# Patient Record
Sex: MALE | Race: WHITE | Employment: FULL TIME | URBAN - METROPOLITAN AREA
[De-identification: names, ages, dates, MRNs, and addresses within clinical notes are randomized per-mention and may not be internally consistent; named-entity substitution may affect disease eponyms.]

---

## 2017-04-17 ENCOUNTER — HOSPITAL ENCOUNTER (OUTPATIENT)
Dept: GENERAL RADIOLOGY | Age: 37
Discharge: HOME | End: 2017-04-17
Payer: COMMERCIAL

## 2017-04-17 ENCOUNTER — HOSPITAL ENCOUNTER (OUTPATIENT)
Age: 37
Discharge: HOME | End: 2017-04-17
Payer: COMMERCIAL

## 2017-04-17 DIAGNOSIS — T14.90XA INJURY: ICD-10-CM

## 2017-04-17 PROCEDURE — 73130 X-RAY EXAM OF HAND: CPT

## 2019-02-05 PROBLEM — I48.0 PAF (PAROXYSMAL ATRIAL FIBRILLATION) (HCC): Status: ACTIVE | Noted: 2019-02-05

## 2019-02-05 PROBLEM — I37.1 NONRHEUMATIC PULMONARY VALVE INSUFFICIENCY: Status: ACTIVE | Noted: 2019-02-05

## 2019-07-11 ENCOUNTER — HOSPITAL ENCOUNTER (OUTPATIENT)
Dept: OCCUPATIONAL THERAPY | Facility: CLINIC | Age: 39
Setting detail: THERAPIES SERIES
Discharge: HOME | End: 2019-07-11
Payer: COMMERCIAL

## 2019-07-11 PROCEDURE — 97165 OT EVAL LOW COMPLEX 30 MIN: CPT

## 2019-07-11 PROCEDURE — 97140 MANUAL THERAPY 1/> REGIONS: CPT

## 2019-07-11 PROCEDURE — 97110 THERAPEUTIC EXERCISES: CPT

## 2019-07-11 NOTE — CONSULTS
Exam (limitations, restrictions) [x]  1-2 []  3 []  4+   Decision Making [x]  Low []  Moderate []  High   ? [x]  Low Complexity []  Moderate Complexity []  High Complexity     Total Treatment Time:  72  Min.      Time In/Time Out: 1073 - 7788       Electronically signed by SIMON Iniguez/L, CHT on 7/11/2019 at 12:57 PM

## 2019-07-17 ENCOUNTER — HOSPITAL ENCOUNTER (OUTPATIENT)
Dept: OCCUPATIONAL THERAPY | Facility: CLINIC | Age: 39
Setting detail: THERAPIES SERIES
Discharge: HOME | End: 2019-07-17
Payer: COMMERCIAL

## 2019-07-17 PROCEDURE — 97110 THERAPEUTIC EXERCISES: CPT

## 2019-07-17 PROCEDURE — 97140 MANUAL THERAPY 1/> REGIONS: CPT

## 2019-07-17 PROCEDURE — 97035 APP MDLTY 1+ULTRASOUND EA 15: CPT

## 2019-07-18 ENCOUNTER — HOSPITAL ENCOUNTER (OUTPATIENT)
Dept: OCCUPATIONAL THERAPY | Facility: CLINIC | Age: 39
Setting detail: THERAPIES SERIES
Discharge: HOME | End: 2019-07-18
Payer: COMMERCIAL

## 2019-07-18 PROCEDURE — 97110 THERAPEUTIC EXERCISES: CPT

## 2019-07-18 PROCEDURE — 97035 APP MDLTY 1+ULTRASOUND EA 15: CPT

## 2019-07-18 PROCEDURE — 97140 MANUAL THERAPY 1/> REGIONS: CPT

## 2019-07-18 NOTE — FLOWSHEET NOTE
[] [place]       Occupational Therapy             1st floor       [address] [place], [place]         Phone: [phone]       Fax: [phone] [x] [address] [facility] at            [address] [facility] , [address] [place] [address]     Phone: [phone]     Fax: [phone]      Occupational Therapy Daily Treatment Note    Date:  2019  Patient Name:  Kaylan Terry    :  1980  MRN: [id number]  Physician: [facility]  Insurance: [id number] [employer]     Medical Diagnosis: Bilateral wrist sprain S63.8x1A, W70.4W0D. Rehab Codes: Stiffness in wrist M25.63, fine motor skills loss R29.818, edema localized R60.0, pain in right forearm M79.631, pain in left forearm M79.632, pain in right hand M79.641, pain in left hand M79.642, muscle wasting of forearm M62.53, muscle wasting of hand M62.54,   paresthesia of skin R20.2.      Onset Date: 19               Next Dr. Coffman [place]: 19   [facility]  #Visits/Total Visits: 3     3 times a week for 9 visits, C-9 approved  Cancels/No Shows:  0/0    Subjective:    Pain:  [] Yes  [x] No Location:  N/A Pain Rating: (0-10 scale) 0/10  Pain altered Tx:  [x] No  [] Yes  Action: A  Pt Comments: \"Middle and ring fingers still numb, no pain really\".     Objective:  Modalities:    Modality Flow Sheet:  START STOP Tx Modality     Electrical Stim:     19  Ultrasound:  0.8 W/cm2 x 8 mins total  Duty factor: __100%  __50%  __33% __20%  Head size: 2 cm  MHz: __1mHz  __3mHz  Location: Bilateral volar wrists, 4 minutes each     Hot Pack:     Cold Pack:       Exercises:  Flow Sheet:  Exercise Reps/Time Weight/Level Comments   Edema massage     Administered     AROM - bilateral forearms, wrists, hands 12 reps   Increased reps  HEP reviewed, pt indep,   Goal MET   Pre-nabil wrist braces: assessment for fit and B wrist position       Pt reports improved comfort and fit.    Resistive  with hand exercisor R - 15 reps  L - 12 reps  10 pounds   Bilat Decreased reps and resistance    Resistive /pinches with putty 5 reps  White  Decreased resistance, decreased reps                  Comments/Assessment:  Pt reports bilat wrists, hands are \"sore\". 5/10 pain reported on right, 4/10 pain reported on left on arrival. Pt reports significant difficulty shaving this morning, dropped razor twice. Pt states he slept well; woke up with numbness of right middle and ring fingers of right and all fingers of left. Sensation has returned to all fingers of left as of arrival, right unchanged. Bilateral wrist and hand edema increased AEB observation and decreased skin wrinkling, more so on right, radial aspect of hand. An extended time was spent on retrograde edema massage today, with significant decrease in pain bilaterally. Ex's decreased; reps and resistance. Edema massage HEP reviewed with pt, including elevation technique. Pt voiced understanding. 0/10 pain reported at end of session. Specific Instructions for Next Treatment:    Treatment Charges: Mins Units   [x]  Modalities:  Ultrasound 8 1   [x]  Ther Exercise 13 1   [x]  Manual Therapy 35 2   []  Ther Activities     []  Orthotic fitting/training     []  Orthotic re-check     []  Other         Assessment: [x] Progressing toward goals. [] No change. [] Other:    Short Term Goals: (  9    Treatments)  1. Decrease Pain: Will have 0/10 pain at rest, will have 2/10 pain with non-resistive to mildly resistive activity. 2. Increase AROM (degrees): Bilateral wrist extension will be +65 or more degrees (WNL), flexion will be 45 or more degrees, ulnar deviation will be 40 or more degrees (WNL), right forearm pronation will be 80 or more degrees, Bilateral supination will be 75 or more degrees. 3. Increase strength (pounds): Right  strength will be 63 or more pounds, left  will be 76 or more pounds, bilateral pinches will be symmetrical (WNL).   4. Increase function: UE Functional Index Score 34/80

## 2019-07-22 ENCOUNTER — HOSPITAL ENCOUNTER (OUTPATIENT)
Dept: OCCUPATIONAL THERAPY | Facility: CLINIC | Age: 39
Setting detail: THERAPIES SERIES
Discharge: HOME | End: 2019-07-22
Payer: COMMERCIAL

## 2019-07-22 PROCEDURE — 97110 THERAPEUTIC EXERCISES: CPT

## 2019-07-22 PROCEDURE — 97140 MANUAL THERAPY 1/> REGIONS: CPT

## 2019-07-24 ENCOUNTER — APPOINTMENT (OUTPATIENT)
Dept: OCCUPATIONAL THERAPY | Facility: CLINIC | Age: 39
End: 2019-07-24
Payer: COMMERCIAL

## 2019-07-25 ENCOUNTER — HOSPITAL ENCOUNTER (OUTPATIENT)
Dept: OCCUPATIONAL THERAPY | Facility: CLINIC | Age: 39
Setting detail: THERAPIES SERIES
Discharge: HOME | End: 2019-07-25
Payer: COMMERCIAL

## 2019-07-25 PROCEDURE — 97035 APP MDLTY 1+ULTRASOUND EA 15: CPT

## 2019-07-25 PROCEDURE — 97110 THERAPEUTIC EXERCISES: CPT

## 2019-07-25 PROCEDURE — 97140 MANUAL THERAPY 1/> REGIONS: CPT

## 2019-07-29 ENCOUNTER — HOSPITAL ENCOUNTER (OUTPATIENT)
Dept: OCCUPATIONAL THERAPY | Facility: CLINIC | Age: 39
Setting detail: THERAPIES SERIES
Discharge: HOME | End: 2019-07-29
Payer: COMMERCIAL

## 2019-07-29 PROCEDURE — 97110 THERAPEUTIC EXERCISES: CPT

## 2019-07-29 PROCEDURE — 97140 MANUAL THERAPY 1/> REGIONS: CPT

## 2019-07-29 NOTE — FLOWSHEET NOTE
[] [place]       Occupational Therapy             1st floor       [address] [place], [place]         Phone: [phone]       Fax: [phone] [x] [address] [facility] at            [address] [facility] , [address] [place] [address]     Phone: [phone]     Fax: [phone]      Occupational Therapy Daily Treatment Note    Date:  2019  Patient Name:  Shaina Marcus    :  1980  MRN: [id number]  Physician: Gavino Conn CNP  Insurance: [facility] [employer]     Medical Diagnosis: Bilateral wrist sprain S63.8x1A, B86.2Q2O. Rehab Codes: Stiffness in wrist M25.63, fine motor skills loss R29.818, edema localized R60.0, pain in right forearm M79.631, pain in left forearm M79.632, pain in right hand M79.641, pain in left hand M79.642, muscle wasting of forearm M62.53, muscle wasting of hand M62.54,   paresthesia of skin R20.2.      Onset Date: 19               Next Dr. Kim Barney: 19   [facility]  #Visits/Total Visits:      3 times a week for 9 visits, C-9 approved  Cancels/No Shows:  0/0    Subjective:    Pain:  [] Yes  [x] No Location:  N/A Pain Rating: (0-10 scale) 0/10  Pain altered Tx:  [x] No  [] Yes  Action: A  Pt Comments: \"Both hands feeling much better since starting steroids\".     Objective:  Modalities:    Modality Flow Sheet:  START STOP Tx Modality     Electrical Stim:     19  Ultrasound:  0.8 W/cm2 x 8 mins total  Duty factor: __100%  __50%  __33% __20%  Head size: 2 cm  MHz: __1mHz  __3mHz  Location: Bilateral volar wrists, 4 minutes each     Hot Pack:     Cold Pack:       Exercises:  Flow Sheet:  Exercise Reps/Time Weight/Level Comments   Edema massage     Administered     AROM - bilateral forearms, wrists, hands 17 reps   Completed     Pre-nabil wrist braces: assessment for fit and B wrist position       Pt reports a good fit    Resistive  with hand exercisor R - 53 reps  L -  57 reps  20 pounds   Bilat Increased resistance and reps

## 2019-07-31 ENCOUNTER — HOSPITAL ENCOUNTER (OUTPATIENT)
Dept: OCCUPATIONAL THERAPY | Facility: CLINIC | Age: 39
Setting detail: THERAPIES SERIES
Discharge: HOME | End: 2019-07-31
Payer: COMMERCIAL

## 2019-08-01 ENCOUNTER — HOSPITAL ENCOUNTER (OUTPATIENT)
Dept: OCCUPATIONAL THERAPY | Facility: CLINIC | Age: 39
Setting detail: THERAPIES SERIES
Discharge: HOME | End: 2019-08-01
Payer: COMMERCIAL

## 2019-08-01 PROCEDURE — 97110 THERAPEUTIC EXERCISES: CPT

## 2019-08-01 PROCEDURE — 97140 MANUAL THERAPY 1/> REGIONS: CPT

## 2019-08-01 NOTE — FLOWSHEET NOTE
- MET (56/80)  5. Decrease Edema: Circumfirential wrist measurements will be decreased 0.3 cm or more, will observe increased dorsal hand skin wrinkling - ongoing. 6. Independent with HEP in 2 sessions - MET.     Long Term Goals: (  18  Treatments)  1. Decrease Pain: Will have 0/10  pain with mildly resistive activity. Ongoing  2. Increase AROM (degrees): Bilateral wrist flexion will be 55 or more degrees, ulnar deviation will be 40 or more degrees, bilateral supination will be 75 or more degrees. All ongoing  3. Increase strength (pounds): Right  strength will be 73 or more pounds, left  will be 86 or more pounds. All ongoing  4. Increase function: UE Functional Index Score 44/80 or more to promote increased function. Ongoing  5. Decrease Edema: Circumfirential wrist measurements will be symmetrical, will observe  dorsal hand skin wrinkling WNL. Ongoing     Patient Goals: 100% recovery - Unmet       Pt. Education:  [] Yes  [x] No  [] Reviewed Prior HEP/Ed  Method of Education: [] Verbal  [] Demo  [] Written  Re:  Comprehension of Education:  [] Verbalizes understanding. [] Demonstrates understanding. [] Needs review. [] Demonstrates/verbalizes HEP/Ed previously given. Treatment Plan: 3 times a week for 9 visits         Time In/Out: 1300 -  1345 Total Treatment Time: 39  Min.       Electronically signed by:  Elaine Bae OT

## 2019-08-07 ENCOUNTER — HOSPITAL ENCOUNTER (OUTPATIENT)
Dept: OCCUPATIONAL THERAPY | Facility: CLINIC | Age: 39
Setting detail: THERAPIES SERIES
Discharge: HOME | End: 2019-08-07
Payer: COMMERCIAL

## 2019-08-07 ENCOUNTER — APPOINTMENT (OUTPATIENT)
Dept: OCCUPATIONAL THERAPY | Facility: CLINIC | Age: 39
End: 2019-08-07
Payer: COMMERCIAL

## 2019-08-07 PROCEDURE — 97110 THERAPEUTIC EXERCISES: CPT

## 2019-08-07 NOTE — FLOWSHEET NOTE
resistance, decreased reps   Resistive /pinches with putty 5 reps  Peach Increased resistance  Putty issued for HEP.    Wrist maze  4 reps ea   Increased reps    Active pron/supin with cones   Discontinued   Pronation/supination bar 10 reps ea 1/2 pound, 1/2 out bar Added   Active wrist flex/extension with Dexteroll 2 series ea 1 pound  Completed              Comments/Assessment:  Pt reports he has been taking a steroid ordered by Mendel Been, CNP for edema with  continued positive results as follows:  1. Pt reports bilat wrists, hands have  0/10 pain on arrival.    2. Pt states numbness of right middle and ring fingers of right hand has resolved. 3. Edema has decreased enough for pt to wear his wedding ring on the left hand \"it fits like normal\". 4. Ex's have all been advanced with resistance added as tolerated. Retrograde massage for edema administered for right only. Pt reports an EMG has been requested. Ex's added, performed, advanced as outlined above with good tolerance and no change in symptoms. Specific Instructions for Next Treatment: Progress Note and measurements to be taken next visit. Treatment Charges: Mins Units   [x]  Modalities:  Ultrasound 8 0   [x]  Ther Exercise 38 3   [x]  Manual Therapy   4 0   []  Ther Activities     []  Orthotic fitting/training     []  Orthotic re-check     []  Other         Assessment: [x] Progressing toward goals. [] No change. [] Other:    Short Term Goals: (  9    Treatments)  1. Decrease Pain: Will have 0/10 pain at rest - ongoing, will have 2/10 pain with non-resistive to mildly resistive activity - MET.   2. Increase AROM (degrees): Bilateral wrist extension will be +65 or more degrees (WNL) - ongoing, flexion will be 45 or more degrees - MET for left, ulnar deviation will be 40 or more degrees (WNL) - ongoing, right forearm pronation will be 80 or more degrees - ongoing, bilateral supination will be 75 or more degrees - ongoing. 3. Increase strength (pounds): Right  strength will be 63 or more pounds - MET (92.3 pounds), left  will be 76 or more pounds - MET (96.3 pounds), bilateral pinches will be symmetrical (WNL) - MET for 3 point pinch bilaterally. 4. Increase function: UE Functional Index Score 34/80 or more to promote increased function - MET (56/80)  5. Decrease Edema: Circumfirential wrist measurements will be decreased 0.3 cm or more, will observe increased dorsal hand skin wrinkling - ongoing. 6. Independent with HEP in 2 sessions - MET.     Long Term Goals: (  18  Treatments)  1. Decrease Pain: Will have 0/10  pain with mildly resistive activity. Ongoing  2. Increase AROM (degrees): Bilateral wrist flexion will be 55 or more degrees, ulnar deviation will be 40 or more degrees, bilateral supination will be 75 or more degrees. All ongoing  3. Increase strength (pounds): Right  strength will be 73 or more pounds, left  will be 86 or more pounds. All ongoing  4. Increase function: UE Functional Index Score 44/80 or more to promote increased function. Ongoing  5. Decrease Edema: Circumfirential wrist measurements will be symmetrical, will observe  dorsal hand skin wrinkling WNL. Ongoing     Patient Goals: 100% recovery - Unmet       Pt. Education:  [] Yes  [] No  [x] Reviewed Prior HEP/Ed  Method of Education: [x] Verbal  [x] Demo  [] Written  Re:  Comprehension of Education:  [] Verbalizes understanding. [] Demonstrates understanding. [] Needs review. [x] Demonstrates/verbalizes HEP/Ed previously given. Treatment Plan: 3 times a week for 9 visits         Time In/Out: 1301 -  1351 Total Treatment Time: 48  Min.       Electronically signed by:  SIMON Caballero/BRYON, MELODY

## 2019-08-08 ENCOUNTER — HOSPITAL ENCOUNTER (OUTPATIENT)
Dept: OCCUPATIONAL THERAPY | Facility: CLINIC | Age: 39
Setting detail: THERAPIES SERIES
Discharge: HOME | End: 2019-08-08
Payer: COMMERCIAL

## 2019-08-08 PROCEDURE — 97110 THERAPEUTIC EXERCISES: CPT

## 2019-08-08 NOTE — PROGRESS NOTES
seconds Percentile Left in seconds Percentile   9 Hole Peg Test 16.07  1.99 seconds faster Greater than the 75th percentile 15.72  0.75 seconds faster Greater than the 75th percentile        Edema:  Circumfirential wrist measurements:  Right 20.2 cm (incr 0.4 cm), Left 20.1 cm (incr 0.5 cm). Dorsum of both hands are mildly edematous AEB observation of skin wrinkling,  and palpation. Sensation:  Pt reports numbness of tips of right middle and ring fingers has resolved. Problems: Pain, ROM, Strength, Function, Edema, Coordination and Sensation      Short Term Goals: (  9    Treatments)   1. Decrease Pain: Will have 0/10 pain at rest - MET, will have 2/10 pain with non-resistive to mildly resistive activity - MET. 2. Increase AROM (degrees): Bilateral wrist extension will be +65 or more degrees (WNL) - MET for left only, flexion will be 45 or more degrees - MET bilaterally, ulnar deviation will be 40 or more degrees (WNL) - Unmet, right forearm pronation will be 80 or more degrees - Unmet, bilateral supination will be 75 or more degrees - MET for right only. 3. Increase strength (pounds): Right  strength will be 63 or more pounds - MET (86 pounds), left  will be 76 or more pounds - MET (103.6 pounds), bilateral pinches will be symmetrical (WNL) - Unmet bilaterally  4. Increase function: UE Functional Index Score 34/80 or more to promote increased function - MET (54/80)  5. Decrease Edema: Circumfirential wrist measurements will be decreased 0.3 cm or more, will observe increased dorsal hand skin wrinkling - Unmet.   6. Independent with HEP in 2 sessions - MET.     Long Term Goals: (  15  Treatments)   1.   Decrease Pain: Will have 0/10  pain with mildly resistive activity.  MET  2.   Increase AROM (degrees): Bilateral wrist flexion will be 55 or more degrees - MET, ulnar deviation will be 40 or more degrees - Ongoing, bilateral supination will be 75 or more degrees - MET for right only  3.   Increase

## 2019-08-12 ENCOUNTER — HOSPITAL ENCOUNTER (OUTPATIENT)
Dept: OCCUPATIONAL THERAPY | Facility: CLINIC | Age: 39
Setting detail: THERAPIES SERIES
Discharge: HOME | End: 2019-08-12
Payer: COMMERCIAL

## 2019-08-12 PROCEDURE — 97035 APP MDLTY 1+ULTRASOUND EA 15: CPT

## 2019-08-12 PROCEDURE — 97110 THERAPEUTIC EXERCISES: CPT

## 2019-08-14 ENCOUNTER — APPOINTMENT (OUTPATIENT)
Dept: OCCUPATIONAL THERAPY | Facility: CLINIC | Age: 39
End: 2019-08-14
Payer: COMMERCIAL

## 2019-08-15 ENCOUNTER — HOSPITAL ENCOUNTER (OUTPATIENT)
Dept: OCCUPATIONAL THERAPY | Facility: CLINIC | Age: 39
Setting detail: THERAPIES SERIES
Discharge: HOME | End: 2019-08-15
Payer: COMMERCIAL

## 2019-08-15 PROCEDURE — 97035 APP MDLTY 1+ULTRASOUND EA 15: CPT

## 2019-08-15 PROCEDURE — 97110 THERAPEUTIC EXERCISES: CPT

## 2019-08-15 PROCEDURE — 97140 MANUAL THERAPY 1/> REGIONS: CPT

## 2019-08-19 ENCOUNTER — HOSPITAL ENCOUNTER (OUTPATIENT)
Dept: OCCUPATIONAL THERAPY | Facility: CLINIC | Age: 39
Setting detail: THERAPIES SERIES
Discharge: HOME | End: 2019-08-19
Payer: COMMERCIAL

## 2019-08-19 PROCEDURE — 97110 THERAPEUTIC EXERCISES: CPT

## 2019-08-19 PROCEDURE — 97035 APP MDLTY 1+ULTRASOUND EA 15: CPT

## 2019-08-19 NOTE — FLOWSHEET NOTE
Resistive /pinches with putty 10 reps  Peach/Orange combo Completed  Plan to increase resistance next visit    Wrist maze   Discontinued   Active pron/supin with cones     Discontinued   Pronation/supination bar 2 sets of  10 reps ea 1/2 pound, end of bar Increased reps and resistance   Active wrist flex/extension with Dexteroll 2 series ea 2.5 pounds Increased  resistance                  Comments/Assessment:  Pt on a second round of steroids, with benefit of 0/10 pain, and edema resolved in both UEs. EMG scheduled for 09/24/19. Ex's performed, advanced as outlined above with good tolerance and no change in symptoms. 0/10 pain at end of session. Specific Instructions for Next Treatment: Progress Note and measurements to be taken next visit. Treatment Charges: Mins Units   [x]  Modalities:  Ultrasound   8 1   [x]  Ther Exercise 35 2   [x]  Manual Therapy   0 0   []  Ther Activities     []  Orthotic fitting/training     []  Orthotic re-check     []  Other         Assessment: [x] Progressing toward goals. [] No change. [] Other:    Short Term Goals: (  9    Treatments)   1. Decrease Pain: Will have 0/10 pain at rest - MET, will have 2/10 pain with non-resistive to mildly resistive activity - MET. 2. Increase AROM (degrees): Bilateral wrist extension will be +65 or more degrees (WNL) - MET for left only, flexion will be 45 or more degrees - MET bilaterally, ulnar deviation will be 40 or more degrees (WNL) - Unmet, right forearm pronation will be 80 or more degrees - Unmet, bilateral supination will be 75 or more degrees - MET for right only. 3. Increase strength (pounds): Right  strength will be 63 or more pounds - MET (86 pounds), left  will be 76 or more pounds - MET (103.6 pounds), bilateral pinches will be symmetrical (WNL) - Unmet bilaterally  4. Increase function: UE Functional Index Score 34/80 or more to promote increased function - MET (54/80)  5.  Decrease Edema:

## 2019-08-21 ENCOUNTER — HOSPITAL ENCOUNTER (OUTPATIENT)
Dept: OCCUPATIONAL THERAPY | Facility: CLINIC | Age: 39
Setting detail: THERAPIES SERIES
Discharge: HOME | End: 2019-08-21
Payer: COMMERCIAL

## 2019-08-21 PROCEDURE — 97110 THERAPEUTIC EXERCISES: CPT

## 2019-08-21 NOTE — FLOWSHEET NOTE
[] [place]       Occupational Therapy             1st floor       [address] [place], [place]         Phone: [phone]       Fax: [phone] [x] [facility] [facility] at            [address] [facility] , [address] [place] [address]     Phone: [phone]     Fax: [phone]      Occupational Therapy Daily Treatment Note    Date:  2019  Patient Name:  Alberto Romano    :  1980  MRN: [id number]  Physician: Holley Forrester CNP  Insurance: [facility] - [employer]     Medical Diagnosis: Bilateral wrist sprain S63.8x1A, D20.7L6F.        Rehab Codes: Stiffness in wrist M25.63, fine motor skills loss R29.818, edema localized R60.0, pain in right forearm M79.631, pain in left forearm M79.632, pain in right hand M79.641, pain in left hand M79.642, muscle wasting of forearm M62.53, muscle wasting of hand M62.54,   paresthesia of skin R20.2.      Onset Date: 19               Next Dr. Julius Tanner: 19   [facility]  #Visits/Total Visits: 13/15    C-9 dated 19 approved for additional 6 visits (15 total)  Cancels/No Shows:  0/0    Subjective:    Pain:  [] Yes  [x] No Location:  N/A Pain Rating: (0-10 scale) 0/10  Pain altered Tx:  [x] No  [] Yes  Action: NA  Pt Comments: NA    Objective:  Modalities:    Modality Flow Sheet:  START STOP Tx Modality     Electrical Stim:     19  Ultrasound:  0.8 W/cm2 x 8 mins total  Duty factor: __100%  __50%  __33% __20%  Head size: 2 cm  MHz: __1mHz  __3mHz  Location: Bilateral volar wrists, 4 minutes each     Hot Pack:     Cold Pack:       Exercises:  Flow Sheet:  Exercise Reps/Time Weight/Level Comments   Edema massage     No edema observed or reported,   Not Administered      AROM - bilateral forearms, wrists, hands 20 reps   Completed, Right only  Left discontinued   Pre-nabil wrist braces: assessment for fit and B wrist position     Pt reports a good fit, continues to use at night.    Resistive  with hand exercisor R - 64 reps   L -  74 reps  35 Edema: Circumfirential wrist measurements will be decreased 0.3 cm or more, will observe increased dorsal hand skin wrinkling - Unmet.   6. Independent with HEP in 2 sessions - MET.     Long Term Goals: (  15  Treatments)   1.   Decrease Pain: Will have 0/10  pain with mildly resistive activity. MET  2.   Increase AROM (degrees): Bilateral wrist flexion will be 55 or more degrees - MET, ulnar deviation will be 40 or more degrees - Ongoing, bilateral supination will be 75 or more degrees - MET for right only  3.   Increase strength (pounds): Right  strength will be 73 or more pounds - MET (86 pounds), left  will be 86 or more pounds - MET (103.6 pounds). New  strength goal (revised 08/08/19): Right  strength will be 85 or more pounds, left  will be 115 or more pounds. 4.   Increase function: UE Functional Index Score 44/80 or more to promote increased function - MET (54/80). New Function goal (revised 08/08/19): UE Functional Index Score 64/80 or more to promote increased function. 5.   Decrease Edema: Circumfirential wrist measurements will be symmetrical, will observe  dorsal hand skin wrinkling WNL. Improved, Unmet     Patient Goals: 100% recovery - Unmet          Pt. Education:  [] Yes  [] No  [x] Reviewed Prior HEP/Ed  Method of Education: [x] Verbal  [] Demo  [] Written  Re:  Comprehension of Education:  [] Verbalizes understanding. [] Demonstrates understanding. [] Needs review. [x] Demonstrates/verbalizes HEP/Ed previously given. Treatment Plan: 3 times a week for 9 visits         Time In/Out: 5823 - 5724 Total Treatment Time:   39  Min.       Electronically signed by:  SIMON Mar/L, MELODY

## 2019-08-22 ENCOUNTER — HOSPITAL ENCOUNTER (OUTPATIENT)
Dept: OCCUPATIONAL THERAPY | Facility: CLINIC | Age: 39
Setting detail: THERAPIES SERIES
Discharge: HOME | End: 2019-08-22
Payer: COMMERCIAL

## 2019-08-22 PROCEDURE — 97110 THERAPEUTIC EXERCISES: CPT

## 2019-08-22 NOTE — FLOWSHEET NOTE
pounds   Bilat Increased reps   Resistive /pinches with putty 10 reps  Orange Completed    Wrist maze   Discontinued   Active pron/supin with cones     Discontinued   Pronation/supination bar 20 ea 1 pound, end of bar Completed   Active wrist flex/extension with Dexteroll 2 series ea 3 pounds Completed                  Comments/Assessment:  Pt saw Dr. Irais Erazo yesterday, pt to complete current script then discharge. Pt to return to work following 09/02/19. One visit remains on current script/C-9 auth. EMG scheduled for 09/24/19.  0/10 pain today, ultrasound on hold. Ex's performed, advanced as outlined above with good tolerance. Putty continued for /pinch strengthening, Zero fatigue reported following putty ex. 0/10 pain at end of session. Brody Hilton Specific Instructions for Next Treatment: Progress Note and measurements to be taken next visit. Treatment Charges: Mins Units   [x]  Modalities:  Ultrasound   0 0   [x]  Ther Exercise 41 3   [x]  Manual Therapy   0 0   []  Ther Activities     []  Orthotic fitting/training     []  Orthotic re-check     []  Other         Assessment: [x] Progressing toward goals. [] No change. [] Other:    Short Term Goals: (  9    Treatments)   1. Decrease Pain: Will have 0/10 pain at rest - MET, will have 2/10 pain with non-resistive to mildly resistive activity - MET. 2. Increase AROM (degrees): Bilateral wrist extension will be +65 or more degrees (WNL) - MET for left only, flexion will be 45 or more degrees - MET bilaterally, ulnar deviation will be 40 or more degrees (WNL) - Unmet, right forearm pronation will be 80 or more degrees - Unmet, bilateral supination will be 75 or more degrees - MET for right only. 3. Increase strength (pounds): Right  strength will be 63 or more pounds - MET (86 pounds), left  will be 76 or more pounds - MET (103.6 pounds), bilateral pinches will be symmetrical (WNL) - Unmet bilaterally  4.  Increase function: UE Functional Index

## 2019-08-26 ENCOUNTER — HOSPITAL ENCOUNTER (OUTPATIENT)
Dept: OCCUPATIONAL THERAPY | Facility: CLINIC | Age: 39
Setting detail: THERAPIES SERIES
Discharge: HOME | End: 2019-08-26
Payer: COMMERCIAL

## 2019-08-26 PROCEDURE — 97110 THERAPEUTIC EXERCISES: CPT

## 2019-08-26 NOTE — DISCHARGE SUMMARY
[] [address] [facility] floor       [address], [place]         Phone: [phone]       Fax: [phone] [x] [address] [facility] at [address] [facility] , [address]  Phone: [phone]  Fax: [phone]       Occupational [address] Extremity  Discharge Note      Date: 2019      Patient: Maryuri Akhtar  : 1980  MRN: [id number]    Physician: Gualberto De Leon CNP  Insurance: [facility] [employer]    Medical Diagnosis: Bilateral wrist sprain S63.8x1A, A26.5Q7P. Rehab Codes: Stiffness in wrist M25.63, fine motor skills loss R29.818, edema localized R60.0, pain in right forearm M79.631, pain in left forearm M79.632, pain in right hand M79.641, pain in left hand M79.642, muscle wasting of forearm M62.53, muscle wasting of hand M62.54,  paresthesia of skin R20.2. Onset Date: 19    Next Dr. Neela Bailey: 19   [facility]  Cancels/No Shows:  2/0  Total visits attended:  15  Date of initial visit: 19                Date of final visit: 19    Past Medical History:   MI/Heart Problems - A fib    Medications:   IB    Allergies:    Bee stings, Dove soap. Mechanism of Injury: Repetitive use of pneumatic tools  Surgery Date: NA    Precautions:  None            Involved Extremity:        Bilateral  Dominant: Right    Previous Level of Function: Globally independent  Critical Job/Daily Task Description: Self care, household tasks, parenting tasks, driving, repetitive use of pneumatic tools at work. Work Status: Off due to injury/Condition   Orthosis:  Has pre-nabil wrist braces, worn at bedtime. Subjective:  Chief Complaint: Inability to work, lack of function.   Pain: Intensity:   0/10 Location: NA  Pain Type: NA    Pain Altered Tx: no  Action Taken:none      Objective:  Tests/Measurements: Upper Extremity Functional Index  Current Functional Level:  64/80 functionally impaired as measured with the Upper Extremity Functional Index Survey.  0-80 scale, with 80 = no Deficits  Pt perceives functional abilities to be slightly increased  Previous Functional Level:  54/80 functionally impaired as measured with the Upper Extremity Functional Index Survey.    Current Functional Level:  24/80 functionally impaired as measured with the Upper Extremity Functional Index Survey. (The UEFI model does not provide any specific cut off points that could classify the upper limb disability degree, however, a minimal detectable change of 9 points is provided. This means that for improvement or deterioration to be considered, between two subsequent evaluations, the scores must differ by at least 9 points.)      STRENGTH   Current 08/26/19                      Pounds RIGHT LEFT    118.3  incr 32.3 116     incr 12.4   Lateral pinch 30+     incr 7+   30+. incr 2+   2 point pinch 23        incr  6   19.    same   3 jaw pinch 25        incr 6   25. incr 2      Both extremities are affected. The left (non-dominant)  is 2% weaker than the right , a 19% improvement from the previous measurement. (affected score/unaffected score, take the total and subtract from 100)   It would be expected that  would be equal or up to 10% more on the dominant side depending on individual's physical activity level. Currently bilateral  are near symmetrical.  The mean  strength for a male, aged 42-38 ([facility]) are as follows: Right 116.8, Left 112.8 pounds. Both  are now above the mean for pt's age/sex. STRENGTH   Comparison, Previous  08/08/19                      Pounds RIGHT LEFT    86  decr 6.3 103.6  incr 7.3   Lateral pinch 23  decr 2   28. incr 2   2 point pinch 17  incr  3   19. incr 1   3 jaw pinch 19  decr 5   23. decr 1      Both extremities are affected.  The right (dominant)  is 17% weaker than the left , a 12.8% decline from the previous measurement. (affected score/unaffected score, take the total and subtract from 100)   It would be expected that  would be equal or up to 10% more on the dominant side depending on individual's physical activity level.   The mean  strength for a male, aged 42-38 ([facility]) are as follows: Right 116.8, Left 112.8 pounds.     STRENGTH  Comparison,  Initial 07/11/19                      Pounds RIGHT LEFT    53 66.3   Lateral pinch 15 22   2 point pinch   9 14   3 jaw pinch 13 17      Both extremities are affected. The right (dominant)  is 20.1% weaker than the left . (affected score/unaffected score, take the total and subtract from 100)   It would be expected that  would be equal or up to 10% more on the dominant side depending on individual's physical activity level.   The mean  strength for a male, aged 42-38 ([facility]) are as follows: Right 116.8, Left 112.8 pounds. AROM: Right thumb is 1.5 cm from touching pad to base of little finger, left thumb has full opposition. Bilateral shoulders and elbows, pt reports no complaints. Full fists demo'd, functional extension present in all fingers. Pt reports/demo's continued mild right thumb triggering.        AROM:  WRIST   Current 08/26/19                          Right AROM Right Strength Left AROM Left Strength   Extension/Flexion +55/55 5/5 WNL +68/53 5/5 WNL   Radial/Ulnar Deviation   32/42 5/5 WNL   35/42    5/5 WNL   Forearm Pronation/Supination   82/75     82/76        AROM:  WRIST   Comparison, Previous 08/08/19                          Right AROM Right Strength Left AROM Left Strength   Extension/Flexion +58/40 4-/5 +72/58 5-/5   Radial/Ulnar Deviation   32/32 4-/5   38/38    5-/5   Forearm Pronation/Supination   68/68     78/72        AROM:   WRIST   Comparison, Initial 07/11/19                          Right AROM Right Strength Left AROM Left Strength   Extension/Flexion +55/32 3+/5 +55/38 3+/5   Radial/Ulnar Deviation   32/32 4-/5   28/28   Pain 4/5   Forearm

## 2019-10-28 ENCOUNTER — TELEPHONE (OUTPATIENT)
Dept: ORTHOPEDIC SURGERY | Age: 39
End: 2019-10-28

## 2019-11-04 ENCOUNTER — OFFICE VISIT (OUTPATIENT)
Dept: ORTHOPEDIC SURGERY | Age: 39
End: 2019-11-04
Payer: COMMERCIAL

## 2019-11-04 VITALS — HEIGHT: 74 IN | BODY MASS INDEX: 40.43 KG/M2 | WEIGHT: 315 LBS

## 2019-11-04 DIAGNOSIS — G56.03 BILATERAL CARPAL TUNNEL SYNDROME: Primary | ICD-10-CM

## 2019-11-04 PROCEDURE — 99203 OFFICE O/P NEW LOW 30 MIN: CPT | Performed by: ORTHOPAEDIC SURGERY

## 2019-11-04 RX ORDER — CLONAZEPAM 0.5 MG/1
0.5 TABLET ORAL EVERY 8 HOURS
COMMUNITY

## 2019-11-15 ENCOUNTER — ANESTHESIA EVENT (OUTPATIENT)
Dept: OPERATING ROOM | Age: 39
End: 2019-11-15
Payer: COMMERCIAL

## 2019-12-02 ENCOUNTER — HOSPITAL ENCOUNTER (OUTPATIENT)
Age: 39
Setting detail: OUTPATIENT SURGERY
Discharge: HOME | End: 2019-12-02
Attending: ORTHOPAEDIC SURGERY | Admitting: ORTHOPAEDIC SURGERY
Payer: COMMERCIAL

## 2019-12-02 ENCOUNTER — ANESTHESIA (OUTPATIENT)
Dept: OPERATING ROOM | Age: 39
End: 2019-12-02
Payer: COMMERCIAL

## 2019-12-02 VITALS — DIASTOLIC BLOOD PRESSURE: 57 MMHG | SYSTOLIC BLOOD PRESSURE: 113 MMHG | OXYGEN SATURATION: 96 %

## 2019-12-02 VITALS
HEIGHT: 74 IN | HEART RATE: 68 BPM | SYSTOLIC BLOOD PRESSURE: 121 MMHG | DIASTOLIC BLOOD PRESSURE: 75 MMHG | BODY MASS INDEX: 40.43 KG/M2 | TEMPERATURE: 97.2 F | OXYGEN SATURATION: 97 % | WEIGHT: 315 LBS | RESPIRATION RATE: 14 BRPM

## 2019-12-02 DIAGNOSIS — G89.18 POST-OP PAIN: Primary | ICD-10-CM

## 2019-12-02 LAB
GLUCOSE BLD-MCNC: 239 MG/DL (ref 75–110)
GLUCOSE BLD-MCNC: 244 MG/DL (ref 75–110)

## 2019-12-02 PROCEDURE — 3700000001 HC ADD 15 MINUTES (ANESTHESIA): Performed by: ORTHOPAEDIC SURGERY

## 2019-12-02 PROCEDURE — 2580000003 HC RX 258: Performed by: ORTHOPAEDIC SURGERY

## 2019-12-02 PROCEDURE — 6360000002 HC RX W HCPCS: Performed by: ORTHOPAEDIC SURGERY

## 2019-12-02 PROCEDURE — 82947 ASSAY GLUCOSE BLOOD QUANT: CPT

## 2019-12-02 PROCEDURE — 2580000003 HC RX 258: Performed by: ANESTHESIOLOGY

## 2019-12-02 PROCEDURE — 3700000000 HC ANESTHESIA ATTENDED CARE: Performed by: ORTHOPAEDIC SURGERY

## 2019-12-02 PROCEDURE — 3600000012 HC SURGERY LEVEL 2 ADDTL 15MIN: Performed by: ORTHOPAEDIC SURGERY

## 2019-12-02 PROCEDURE — 2709999900 HC NON-CHARGEABLE SUPPLY: Performed by: ORTHOPAEDIC SURGERY

## 2019-12-02 PROCEDURE — 7100000010 HC PHASE II RECOVERY - FIRST 15 MIN: Performed by: ORTHOPAEDIC SURGERY

## 2019-12-02 PROCEDURE — 6360000002 HC RX W HCPCS: Performed by: NURSE ANESTHETIST, CERTIFIED REGISTERED

## 2019-12-02 PROCEDURE — 3600000002 HC SURGERY LEVEL 2 BASE: Performed by: ORTHOPAEDIC SURGERY

## 2019-12-02 PROCEDURE — 64721 CARPAL TUNNEL SURGERY: CPT | Performed by: ORTHOPAEDIC SURGERY

## 2019-12-02 PROCEDURE — 2500000003 HC RX 250 WO HCPCS: Performed by: ORTHOPAEDIC SURGERY

## 2019-12-02 PROCEDURE — 7100000011 HC PHASE II RECOVERY - ADDTL 15 MIN: Performed by: ORTHOPAEDIC SURGERY

## 2019-12-02 RX ORDER — BUPIVACAINE HYDROCHLORIDE 5 MG/ML
INJECTION, SOLUTION EPIDURAL; INTRACAUDAL
Status: DISCONTINUED
Start: 2019-12-02 | End: 2019-12-02 | Stop reason: HOSPADM

## 2019-12-02 RX ORDER — HYDROCODONE BITARTRATE AND ACETAMINOPHEN 5; 325 MG/1; MG/1
2 TABLET ORAL PRN
Status: DISCONTINUED | OUTPATIENT
Start: 2019-12-02 | End: 2019-12-02 | Stop reason: HOSPADM

## 2019-12-02 RX ORDER — SODIUM CHLORIDE 0.9 % (FLUSH) 0.9 %
10 SYRINGE (ML) INJECTION EVERY 12 HOURS SCHEDULED
Status: DISCONTINUED | OUTPATIENT
Start: 2019-12-02 | End: 2019-12-02 | Stop reason: HOSPADM

## 2019-12-02 RX ORDER — HYDROCODONE BITARTRATE AND ACETAMINOPHEN 5; 325 MG/1; MG/1
1 TABLET ORAL EVERY 6 HOURS PRN
Qty: 15 TABLET | Refills: 0 | Status: SHIPPED | OUTPATIENT
Start: 2019-12-02 | End: 2019-12-07

## 2019-12-02 RX ORDER — ONDANSETRON 2 MG/ML
4 INJECTION INTRAMUSCULAR; INTRAVENOUS
Status: DISCONTINUED | OUTPATIENT
Start: 2019-12-02 | End: 2019-12-02 | Stop reason: HOSPADM

## 2019-12-02 RX ORDER — HYDROCODONE BITARTRATE AND ACETAMINOPHEN 5; 325 MG/1; MG/1
1 TABLET ORAL PRN
Status: DISCONTINUED | OUTPATIENT
Start: 2019-12-02 | End: 2019-12-02 | Stop reason: HOSPADM

## 2019-12-02 RX ORDER — PROPOFOL 10 MG/ML
INJECTION, EMULSION INTRAVENOUS
Status: COMPLETED
Start: 2019-12-02 | End: 2019-12-02

## 2019-12-02 RX ORDER — SODIUM CHLORIDE 0.9 % (FLUSH) 0.9 %
10 SYRINGE (ML) INJECTION PRN
Status: DISCONTINUED | OUTPATIENT
Start: 2019-12-02 | End: 2019-12-02 | Stop reason: HOSPADM

## 2019-12-02 RX ORDER — MORPHINE SULFATE 1 MG/ML
1 INJECTION, SOLUTION EPIDURAL; INTRATHECAL; INTRAVENOUS EVERY 5 MIN PRN
Status: DISCONTINUED | OUTPATIENT
Start: 2019-12-02 | End: 2019-12-02 | Stop reason: HOSPADM

## 2019-12-02 RX ORDER — DIPHENHYDRAMINE HYDROCHLORIDE 50 MG/ML
12.5 INJECTION INTRAMUSCULAR; INTRAVENOUS
Status: DISCONTINUED | OUTPATIENT
Start: 2019-12-02 | End: 2019-12-02 | Stop reason: HOSPADM

## 2019-12-02 RX ORDER — SODIUM CHLORIDE, SODIUM LACTATE, POTASSIUM CHLORIDE, CALCIUM CHLORIDE 600; 310; 30; 20 MG/100ML; MG/100ML; MG/100ML; MG/100ML
INJECTION, SOLUTION INTRAVENOUS CONTINUOUS
Status: DISCONTINUED | OUTPATIENT
Start: 2019-12-02 | End: 2019-12-02 | Stop reason: HOSPADM

## 2019-12-02 RX ORDER — PROMETHAZINE HYDROCHLORIDE 25 MG/ML
6.25 INJECTION, SOLUTION INTRAMUSCULAR; INTRAVENOUS
Status: DISCONTINUED | OUTPATIENT
Start: 2019-12-02 | End: 2019-12-02 | Stop reason: HOSPADM

## 2019-12-02 RX ORDER — PROPOFOL 10 MG/ML
INJECTION, EMULSION INTRAVENOUS CONTINUOUS PRN
Status: DISCONTINUED | OUTPATIENT
Start: 2019-12-02 | End: 2019-12-02 | Stop reason: SDUPTHER

## 2019-12-02 RX ORDER — MIDAZOLAM HYDROCHLORIDE 1 MG/ML
2 INJECTION INTRAMUSCULAR; INTRAVENOUS
Status: DISCONTINUED | OUTPATIENT
Start: 2019-12-02 | End: 2019-12-02 | Stop reason: HOSPADM

## 2019-12-02 RX ORDER — FENTANYL CITRATE 50 UG/ML
25 INJECTION, SOLUTION INTRAMUSCULAR; INTRAVENOUS EVERY 5 MIN PRN
Status: DISCONTINUED | OUTPATIENT
Start: 2019-12-02 | End: 2019-12-02 | Stop reason: HOSPADM

## 2019-12-02 RX ORDER — CEFAZOLIN SODIUM 1 G/3ML
INJECTION, POWDER, FOR SOLUTION INTRAMUSCULAR; INTRAVENOUS
Status: DISCONTINUED
Start: 2019-12-02 | End: 2019-12-02 | Stop reason: HOSPADM

## 2019-12-02 RX ORDER — HYDRALAZINE HYDROCHLORIDE 20 MG/ML
5 INJECTION INTRAMUSCULAR; INTRAVENOUS EVERY 10 MIN PRN
Status: DISCONTINUED | OUTPATIENT
Start: 2019-12-02 | End: 2019-12-02 | Stop reason: HOSPADM

## 2019-12-02 RX ORDER — SODIUM CHLORIDE 9 MG/ML
INJECTION, SOLUTION INTRAVENOUS CONTINUOUS
Status: DISCONTINUED | OUTPATIENT
Start: 2019-12-02 | End: 2019-12-02 | Stop reason: HOSPADM

## 2019-12-02 RX ORDER — BUPIVACAINE HYDROCHLORIDE 5 MG/ML
INJECTION, SOLUTION EPIDURAL; INTRACAUDAL PRN
Status: DISCONTINUED | OUTPATIENT
Start: 2019-12-02 | End: 2019-12-02 | Stop reason: ALTCHOICE

## 2019-12-02 RX ORDER — PROPOFOL 10 MG/ML
INJECTION, EMULSION INTRAVENOUS PRN
Status: DISCONTINUED | OUTPATIENT
Start: 2019-12-02 | End: 2019-12-02 | Stop reason: SDUPTHER

## 2019-12-02 RX ADMIN — SODIUM CHLORIDE: 9 INJECTION, SOLUTION INTRAVENOUS at 08:43

## 2019-12-02 RX ADMIN — PROPOFOL 50 MG: 10 INJECTION, EMULSION INTRAVENOUS at 08:55

## 2019-12-02 RX ADMIN — DEXTROSE MONOHYDRATE 3 G: 50 INJECTION, SOLUTION INTRAVENOUS at 08:52

## 2019-12-02 RX ADMIN — PROPOFOL 100 MG: 10 INJECTION, EMULSION INTRAVENOUS at 08:49

## 2019-12-02 RX ADMIN — PROPOFOL 100 MCG/KG/MIN: 10 INJECTION, EMULSION INTRAVENOUS at 08:49

## 2019-12-02 ASSESSMENT — PULMONARY FUNCTION TESTS
PIF_VALUE: 0
PIF_VALUE: 7
PIF_VALUE: 0

## 2019-12-02 ASSESSMENT — PAIN SCALES - GENERAL
PAINLEVEL_OUTOF10: 0

## 2019-12-02 ASSESSMENT — PAIN - FUNCTIONAL ASSESSMENT: PAIN_FUNCTIONAL_ASSESSMENT: 0-10

## 2019-12-06 ENCOUNTER — POST-OP TELEPHONE (OUTPATIENT)
Dept: ORTHOPEDIC SURGERY | Age: 39
End: 2019-12-06

## 2019-12-16 ENCOUNTER — OFFICE VISIT (OUTPATIENT)
Dept: ORTHOPEDIC SURGERY | Age: 39
End: 2019-12-16

## 2019-12-16 VITALS — BODY MASS INDEX: 40.43 KG/M2 | WEIGHT: 315 LBS | HEIGHT: 74 IN

## 2019-12-16 DIAGNOSIS — G56.03 BILATERAL CARPAL TUNNEL SYNDROME: Primary | ICD-10-CM

## 2019-12-16 PROCEDURE — 99024 POSTOP FOLLOW-UP VISIT: CPT | Performed by: ORTHOPAEDIC SURGERY

## 2019-12-16 RX ORDER — AMOXICILLIN 500 MG/1
500 CAPSULE ORAL 2 TIMES DAILY
Qty: 14 CAPSULE | Refills: 0 | Status: SHIPPED | OUTPATIENT
Start: 2019-12-16 | End: 2019-12-23

## 2019-12-18 ENCOUNTER — TELEPHONE (OUTPATIENT)
Dept: ORTHOPEDIC SURGERY | Age: 39
End: 2019-12-18

## 2019-12-23 ENCOUNTER — OFFICE VISIT (OUTPATIENT)
Dept: ORTHOPEDIC SURGERY | Age: 39
End: 2019-12-23

## 2019-12-23 VITALS — BODY MASS INDEX: 40.43 KG/M2 | WEIGHT: 315 LBS | HEIGHT: 74 IN

## 2019-12-23 DIAGNOSIS — G56.03 BILATERAL CARPAL TUNNEL SYNDROME: Primary | ICD-10-CM

## 2019-12-23 PROCEDURE — 99024 POSTOP FOLLOW-UP VISIT: CPT | Performed by: ORTHOPAEDIC SURGERY

## 2020-01-14 ENCOUNTER — TELEPHONE (OUTPATIENT)
Dept: ORTHOPEDIC SURGERY | Age: 40
End: 2020-01-14

## 2020-01-14 NOTE — TELEPHONE ENCOUNTER
Patient called in to request that hie restrictions document be updated due to his surgery being on 1/27/2020. His restrictions letter expires on Friday 1/17/2020 and he needs it updated. He needs a new restriction letter for the 10 day gap.  Please contact patient and advise, Thank You

## 2020-01-24 ENCOUNTER — ANESTHESIA EVENT (OUTPATIENT)
Dept: OPERATING ROOM | Age: 40
End: 2020-01-24
Payer: COMMERCIAL

## 2020-01-27 ENCOUNTER — HOSPITAL ENCOUNTER (OUTPATIENT)
Age: 40
Setting detail: OUTPATIENT SURGERY
Discharge: HOME | End: 2020-01-27
Attending: ORTHOPAEDIC SURGERY | Admitting: ORTHOPAEDIC SURGERY
Payer: COMMERCIAL

## 2020-01-27 ENCOUNTER — ANESTHESIA (OUTPATIENT)
Dept: OPERATING ROOM | Age: 40
End: 2020-01-27
Payer: COMMERCIAL

## 2020-01-27 ENCOUNTER — TELEPHONE (OUTPATIENT)
Dept: ADMINISTRATIVE | Age: 40
End: 2020-01-27

## 2020-01-27 VITALS
OXYGEN SATURATION: 95 % | RESPIRATION RATE: 19 BRPM | BODY MASS INDEX: 40.43 KG/M2 | TEMPERATURE: 98 F | SYSTOLIC BLOOD PRESSURE: 123 MMHG | HEIGHT: 74 IN | HEART RATE: 74 BPM | WEIGHT: 315 LBS | DIASTOLIC BLOOD PRESSURE: 76 MMHG

## 2020-01-27 VITALS — OXYGEN SATURATION: 97 % | DIASTOLIC BLOOD PRESSURE: 62 MMHG | SYSTOLIC BLOOD PRESSURE: 117 MMHG | TEMPERATURE: 96.8 F

## 2020-01-27 LAB
GLUCOSE BLD-MCNC: 201 MG/DL (ref 75–110)
GLUCOSE BLD-MCNC: 211 MG/DL (ref 75–110)

## 2020-01-27 PROCEDURE — 2580000003 HC RX 258: Performed by: ANESTHESIOLOGY

## 2020-01-27 PROCEDURE — 3700000000 HC ANESTHESIA ATTENDED CARE: Performed by: ORTHOPAEDIC SURGERY

## 2020-01-27 PROCEDURE — 2500000003 HC RX 250 WO HCPCS: Performed by: ORTHOPAEDIC SURGERY

## 2020-01-27 PROCEDURE — 6360000002 HC RX W HCPCS: Performed by: ANESTHESIOLOGY

## 2020-01-27 PROCEDURE — 2580000003 HC RX 258: Performed by: ORTHOPAEDIC SURGERY

## 2020-01-27 PROCEDURE — 7100000000 HC PACU RECOVERY - FIRST 15 MIN: Performed by: ORTHOPAEDIC SURGERY

## 2020-01-27 PROCEDURE — 7100000011 HC PHASE II RECOVERY - ADDTL 15 MIN: Performed by: ORTHOPAEDIC SURGERY

## 2020-01-27 PROCEDURE — 3600000013 HC SURGERY LEVEL 3 ADDTL 15MIN: Performed by: ORTHOPAEDIC SURGERY

## 2020-01-27 PROCEDURE — 2709999900 HC NON-CHARGEABLE SUPPLY: Performed by: ORTHOPAEDIC SURGERY

## 2020-01-27 PROCEDURE — 64721 CARPAL TUNNEL SURGERY: CPT | Performed by: ORTHOPAEDIC SURGERY

## 2020-01-27 PROCEDURE — 2500000003 HC RX 250 WO HCPCS: Performed by: ANESTHESIOLOGY

## 2020-01-27 PROCEDURE — 6360000002 HC RX W HCPCS: Performed by: STUDENT IN AN ORGANIZED HEALTH CARE EDUCATION/TRAINING PROGRAM

## 2020-01-27 PROCEDURE — 3600000003 HC SURGERY LEVEL 3 BASE: Performed by: ORTHOPAEDIC SURGERY

## 2020-01-27 PROCEDURE — 7100000010 HC PHASE II RECOVERY - FIRST 15 MIN: Performed by: ORTHOPAEDIC SURGERY

## 2020-01-27 PROCEDURE — 82947 ASSAY GLUCOSE BLOOD QUANT: CPT

## 2020-01-27 PROCEDURE — 3700000001 HC ADD 15 MINUTES (ANESTHESIA): Performed by: ORTHOPAEDIC SURGERY

## 2020-01-27 RX ORDER — SODIUM CHLORIDE 9 MG/ML
INJECTION, SOLUTION INTRAVENOUS CONTINUOUS PRN
Status: DISCONTINUED | OUTPATIENT
Start: 2020-01-27 | End: 2020-01-27 | Stop reason: SDUPTHER

## 2020-01-27 RX ORDER — SODIUM CHLORIDE 0.9 % (FLUSH) 0.9 %
10 SYRINGE (ML) INJECTION PRN
Status: DISCONTINUED | OUTPATIENT
Start: 2020-01-27 | End: 2020-01-27 | Stop reason: HOSPADM

## 2020-01-27 RX ORDER — FENTANYL CITRATE 50 UG/ML
25 INJECTION, SOLUTION INTRAMUSCULAR; INTRAVENOUS EVERY 5 MIN PRN
Status: DISCONTINUED | OUTPATIENT
Start: 2020-01-27 | End: 2020-01-27 | Stop reason: HOSPADM

## 2020-01-27 RX ORDER — HYDRALAZINE HYDROCHLORIDE 20 MG/ML
5 INJECTION INTRAMUSCULAR; INTRAVENOUS EVERY 10 MIN PRN
Status: DISCONTINUED | OUTPATIENT
Start: 2020-01-27 | End: 2020-01-27 | Stop reason: HOSPADM

## 2020-01-27 RX ORDER — SODIUM CHLORIDE 0.9 % (FLUSH) 0.9 %
10 SYRINGE (ML) INJECTION EVERY 12 HOURS SCHEDULED
Status: DISCONTINUED | OUTPATIENT
Start: 2020-01-27 | End: 2020-01-27 | Stop reason: HOSPADM

## 2020-01-27 RX ORDER — BUPIVACAINE HYDROCHLORIDE AND EPINEPHRINE 5; 5 MG/ML; UG/ML
INJECTION, SOLUTION EPIDURAL; INTRACAUDAL; PERINEURAL
Status: DISCONTINUED
Start: 2020-01-27 | End: 2020-01-27 | Stop reason: HOSPADM

## 2020-01-27 RX ORDER — FENTANYL CITRATE 50 UG/ML
50 INJECTION, SOLUTION INTRAMUSCULAR; INTRAVENOUS EVERY 5 MIN PRN
Status: DISCONTINUED | OUTPATIENT
Start: 2020-01-27 | End: 2020-01-27 | Stop reason: HOSPADM

## 2020-01-27 RX ORDER — MIDAZOLAM HYDROCHLORIDE 1 MG/ML
2 INJECTION INTRAMUSCULAR; INTRAVENOUS
Status: DISCONTINUED | OUTPATIENT
Start: 2020-01-27 | End: 2020-01-27 | Stop reason: HOSPADM

## 2020-01-27 RX ORDER — KETOROLAC TROMETHAMINE 30 MG/ML
INJECTION, SOLUTION INTRAMUSCULAR; INTRAVENOUS PRN
Status: DISCONTINUED | OUTPATIENT
Start: 2020-01-27 | End: 2020-01-27 | Stop reason: SDUPTHER

## 2020-01-27 RX ORDER — DEXAMETHASONE SODIUM PHOSPHATE 10 MG/ML
INJECTION, SOLUTION INTRAMUSCULAR; INTRAVENOUS PRN
Status: DISCONTINUED | OUTPATIENT
Start: 2020-01-27 | End: 2020-01-27 | Stop reason: SDUPTHER

## 2020-01-27 RX ORDER — ONDANSETRON 2 MG/ML
INJECTION INTRAMUSCULAR; INTRAVENOUS PRN
Status: DISCONTINUED | OUTPATIENT
Start: 2020-01-27 | End: 2020-01-27 | Stop reason: SDUPTHER

## 2020-01-27 RX ORDER — METOCLOPRAMIDE HYDROCHLORIDE 5 MG/ML
10 INJECTION INTRAMUSCULAR; INTRAVENOUS
Status: DISCONTINUED | OUTPATIENT
Start: 2020-01-27 | End: 2020-01-27 | Stop reason: HOSPADM

## 2020-01-27 RX ORDER — SODIUM CHLORIDE 9 MG/ML
INJECTION, SOLUTION INTRAVENOUS CONTINUOUS
Status: DISCONTINUED | OUTPATIENT
Start: 2020-01-27 | End: 2020-01-27 | Stop reason: HOSPADM

## 2020-01-27 RX ORDER — SODIUM CHLORIDE, SODIUM LACTATE, POTASSIUM CHLORIDE, CALCIUM CHLORIDE 600; 310; 30; 20 MG/100ML; MG/100ML; MG/100ML; MG/100ML
INJECTION, SOLUTION INTRAVENOUS CONTINUOUS
Status: DISCONTINUED | OUTPATIENT
Start: 2020-01-27 | End: 2020-01-27 | Stop reason: HOSPADM

## 2020-01-27 RX ORDER — FENTANYL CITRATE 50 UG/ML
INJECTION, SOLUTION INTRAMUSCULAR; INTRAVENOUS PRN
Status: DISCONTINUED | OUTPATIENT
Start: 2020-01-27 | End: 2020-01-27 | Stop reason: SDUPTHER

## 2020-01-27 RX ORDER — LIDOCAINE HYDROCHLORIDE 10 MG/ML
INJECTION, SOLUTION EPIDURAL; INFILTRATION; INTRACAUDAL; PERINEURAL PRN
Status: DISCONTINUED | OUTPATIENT
Start: 2020-01-27 | End: 2020-01-27 | Stop reason: SDUPTHER

## 2020-01-27 RX ORDER — BUPIVACAINE HYDROCHLORIDE 5 MG/ML
INJECTION, SOLUTION EPIDURAL; INTRACAUDAL PRN
Status: DISCONTINUED | OUTPATIENT
Start: 2020-01-27 | End: 2020-01-27 | Stop reason: ALTCHOICE

## 2020-01-27 RX ORDER — MAGNESIUM HYDROXIDE 1200 MG/15ML
LIQUID ORAL CONTINUOUS PRN
Status: COMPLETED | OUTPATIENT
Start: 2020-01-27 | End: 2020-01-27

## 2020-01-27 RX ORDER — PROPOFOL 10 MG/ML
INJECTION, EMULSION INTRAVENOUS PRN
Status: DISCONTINUED | OUTPATIENT
Start: 2020-01-27 | End: 2020-01-27 | Stop reason: SDUPTHER

## 2020-01-27 RX ORDER — MIDAZOLAM HYDROCHLORIDE 1 MG/ML
INJECTION INTRAMUSCULAR; INTRAVENOUS PRN
Status: DISCONTINUED | OUTPATIENT
Start: 2020-01-27 | End: 2020-01-27 | Stop reason: SDUPTHER

## 2020-01-27 RX ORDER — BUPIVACAINE HYDROCHLORIDE 5 MG/ML
INJECTION, SOLUTION EPIDURAL; INTRACAUDAL
Status: DISCONTINUED
Start: 2020-01-27 | End: 2020-01-27 | Stop reason: HOSPADM

## 2020-01-27 RX ORDER — HYDROCODONE BITARTRATE AND ACETAMINOPHEN 5; 325 MG/1; MG/1
1 TABLET ORAL EVERY 6 HOURS PRN
Qty: 15 TABLET | Refills: 0 | Status: SHIPPED | OUTPATIENT
Start: 2020-01-27 | End: 2020-02-01

## 2020-01-27 RX ORDER — ONDANSETRON 2 MG/ML
4 INJECTION INTRAMUSCULAR; INTRAVENOUS
Status: DISCONTINUED | OUTPATIENT
Start: 2020-01-27 | End: 2020-01-27 | Stop reason: HOSPADM

## 2020-01-27 RX ADMIN — SODIUM CHLORIDE: 9 INJECTION, SOLUTION INTRAVENOUS at 07:45

## 2020-01-27 RX ADMIN — MIDAZOLAM HYDROCHLORIDE 2 MG: 1 INJECTION, SOLUTION INTRAMUSCULAR; INTRAVENOUS at 08:01

## 2020-01-27 RX ADMIN — LIDOCAINE HYDROCHLORIDE 5 ML: 10 INJECTION, SOLUTION EPIDURAL; INFILTRATION; INTRACAUDAL; PERINEURAL at 08:04

## 2020-01-27 RX ADMIN — Medication 3 G: at 08:03

## 2020-01-27 RX ADMIN — ONDANSETRON 4 MG: 2 INJECTION, SOLUTION INTRAMUSCULAR; INTRAVENOUS at 08:08

## 2020-01-27 RX ADMIN — PROPOFOL 200 MG: 10 INJECTION, EMULSION INTRAVENOUS at 08:05

## 2020-01-27 RX ADMIN — FENTANYL CITRATE 100 MCG: 50 INJECTION INTRAMUSCULAR; INTRAVENOUS at 08:02

## 2020-01-27 RX ADMIN — KETOROLAC TROMETHAMINE 30 MG: 30 INJECTION, SOLUTION INTRAMUSCULAR at 08:20

## 2020-01-27 RX ADMIN — DEXAMETHASONE SODIUM PHOSPHATE 8 MG: 10 INJECTION, SOLUTION INTRAMUSCULAR; INTRAVENOUS at 08:08

## 2020-01-27 ASSESSMENT — PULMONARY FUNCTION TESTS
PIF_VALUE: 0
PIF_VALUE: 0
PIF_VALUE: 13
PIF_VALUE: 11
PIF_VALUE: 13
PIF_VALUE: 0
PIF_VALUE: 13
PIF_VALUE: 3
PIF_VALUE: 0
PIF_VALUE: 13
PIF_VALUE: 1
PIF_VALUE: 14
PIF_VALUE: 13
PIF_VALUE: 12
PIF_VALUE: 15
PIF_VALUE: 22
PIF_VALUE: 14
PIF_VALUE: 13
PIF_VALUE: 13
PIF_VALUE: 1
PIF_VALUE: 13
PIF_VALUE: 14
PIF_VALUE: 13
PIF_VALUE: 1
PIF_VALUE: 1
PIF_VALUE: 13
PIF_VALUE: 13
PIF_VALUE: 1
PIF_VALUE: 14

## 2020-01-27 ASSESSMENT — PAIN - FUNCTIONAL ASSESSMENT: PAIN_FUNCTIONAL_ASSESSMENT: 0-10

## 2020-01-27 ASSESSMENT — PAIN SCALES - GENERAL
PAINLEVEL_OUTOF10: 0

## 2020-01-27 NOTE — H&P
Surgical History and Physical Exam    Reason for surgery:  The patient is a 37 y.o. male with left carpal tunnel syndrome. Pt here for left carpal tunnel release. Past Medical History:    Past Medical History:   Diagnosis Date    Arrhythmia     Atrial fibrillation ([address]     Diabetes ([facility]     Hypertension     Palpitations      Past Surgical History:    Past Surgical History:   Procedure Laterality Date    CARPAL TUNNEL RELEASE Right 12/02/2019    CARPAL TUNNEL RELEASE Right 12/2/2019    CARPAL TUNNEL RELEASE performed by Beatrice Greene MD at [address] [place] [address].     Medications Prior to Admission:   Prior to Admission medications    Medication Sig Start Date End Date Taking? Authorizing Provider   metFORMIN (GLUCOPHAGE) 500 MG tablet Take 500 mg by mouth 2 times daily (with meals)   Yes Historical Provider, MD   clonazePAM (KLONOPIN) 0.5 MG tablet Take 0.5 mg by mouth every 8 hours. Yes Historical Provider, MD   metoprolol succinate (TOPROL XL) 25 MG extended release tablet Take 1 tablet by mouth daily 2/11/19  Yes USHA Crespo CNP   flecainide (TAMBOCOR) 100 MG tablet Take 1 tablet by mouth 2 times daily as needed (PAF/Palpitations)  Patient taking differently: Take 100 mg by mouth 2 times daily  2/5/19  Yes USHA Crespo CNP     Allergies:    Patient has no known allergies. Social History:   Social History     Socioeconomic History    Marital status:      Spouse name: None    Number of children: None    Years of education: None    Highest education level: None   Occupational History    None   Social Needs    Financial resource strain: None    Food insecurity:     Worry: None     Inability: None    Transportation needs:     Medical: None     Non-medical: None   Tobacco Use    Smoking status: Never Smoker    Smokeless tobacco: Never Used   Substance and Sexual Activity    Alcohol use:  Yes    Drug use: No    Sexual activity: Yes     Partners: Female   Lifestyle postoperative pain, recurrence of symptoms, need for future surgery,  risks of anesthesia, loss of limb and loss of life were all discussed with the patient. Knowing these risks, the patient wishes to proceed with surgery. -Abx OCTOR  -Site marked, Consent in chart  -AC held  -NPO since midnight  -All question answered.     Francia Craven DO  Orthopedic Surgery Resident PGY-2  [facility]

## 2020-01-27 NOTE — PROGRESS NOTES
CLINICAL PHARMACY NOTE: MEDS TO [address] [facility] Patient?: No  Total # of Prescriptions Filled: 1   The following medications were delivered to the patient:  · Hydrocodone-acetaminophen  Total # of Interventions Completed: 0  Time Spent (min): 0    Additional Documentation:

## 2020-01-27 NOTE — ANESTHESIA POSTPROCEDURE EVALUATION
Department of Anesthesiology  Postprocedure Note    Patient: Shivani Ott  MRN: [id number]  YOB: 1980  Date of evaluation: 1/27/2020  Time:  9:27 AM     Procedure Summary     Date:  01/27/20 [facility] / [facility] [address] [facility]    Anesthesia Start:  7481 Anesthesia Stop:  8698    Procedure:  CARPAL TUNNEL RELEASE (N/A ) Diagnosis:  (CARPAL TUNNEL SYNDROME)    Surgeon:  Beatrice Greene MD Responsible Provider:  Debbie Prince MD    Anesthesia Type:  general ASA Status:  3          Anesthesia Type: general    Romario Phase I: Romario Score: 8    Romario Phase II: Romario Score: 10    Last vitals: Reviewed and per EMR flowsheets.        Anesthesia Post Evaluation    Patient location during evaluation: PACU  Patient participation: complete - patient participated  Level of consciousness: awake and alert  Pain score: 1  Airway patency: patent  Nausea & Vomiting: no nausea and no vomiting  Complications: no  Cardiovascular status: blood pressure returned to baseline and hemodynamically stable  Respiratory status: acceptable  Hydration status: euvolemic

## 2020-01-28 NOTE — OP NOTE
[address] [facility]                                OPERATIVE REPORT    PATIENT NAME: Dale Mackey                     :        1980  MED REC NO:   [id number]                             ROOM:  ACCOUNT NO:   [de-identified]                           ADMIT DATE: 2020  PROVIDER:     Gabriella Ge    DATE OF PROCEDURE:  2020    PREOPERATIVE DIAGNOSIS:  Carpal tunnel syndrome, left. POSTOPERATIVE DIAGNOSIS:  Carpal tunnel syndrome, left. PROCEDURE:  Open left carpal tunnel release. SURGEON:  Gabriella Ge MD    ESTIMATED BLOOD LOSS:  0.    ANESTHESIA:  General with an LMA. SPECIMENS:  None. BRIEF CLINICAL HISTORY:  The patient is a 47-year-old male, carpal  tunnel syndrome bilaterally, had a release done on the right side,  presents at this time for release on the left. The patient understands  the risk of the procedure including infection requiring operative  irrigation and debridement. He understands the risk of continued  symptoms; the risk of damage to neurovascular structures, namely the  palmar cutaneous and motor branch of the median nerve as well as the  risk of damage directly to the trunk of the median nerve. He also  understands the risk of anesthesia. OPERATIVE NOTE:  The patient was taken to the operating room on  2020, placed supine on the OR table. Anesthesia was induced via  general inhalant with an LMA. He was given 2 gm Ancef IV for  prophylaxis. A well-padded tourniquet was applied to the left upper  arm. Left arm and hand were then prepped and draped in the usual  sterile fashion. This was exsanguinated with an Esmarch bandage and the  tourniquet inflated to 200 mmHg. Incision was marked starting at the  level of the abductor thumb distally, carried in-line with the radial  border of the ring finger to just distal to the wrist crease.   This

## 2020-02-03 ENCOUNTER — OFFICE VISIT (OUTPATIENT)
Dept: ORTHOPEDIC SURGERY | Age: 40
End: 2020-02-03

## 2020-02-03 VITALS — WEIGHT: 315 LBS | BODY MASS INDEX: 40.43 KG/M2 | HEIGHT: 74 IN

## 2020-02-03 PROCEDURE — 99024 POSTOP FOLLOW-UP VISIT: CPT | Performed by: ORTHOPAEDIC SURGERY

## 2020-02-03 RX ORDER — HYDROCODONE BITARTRATE AND ACETAMINOPHEN 5; 325 MG/1; MG/1
1 TABLET ORAL EVERY 6 HOURS PRN
Qty: 28 TABLET | Refills: 0 | Status: SHIPPED | OUTPATIENT
Start: 2020-02-03 | End: 2020-02-10

## 2020-02-03 NOTE — LETTER
[facility]  [address] [place] [address]  Phone: [phone]  Fax: [phone]    Thuy Karimi MD        February 3, 2020     Patient: Colten Zhang   YOB: 1980   Date of Visit: 2/3/2020       To Whom It May Concern: It is my medical opinion that Maddie Holden may return to work on 02/10/2020. If you have any questions or concerns, please don't hesitate to call.     Sincerely,        Thuy Karimi MD Diabetes HTN (hypertension)

## 2020-02-10 ENCOUNTER — OFFICE VISIT (OUTPATIENT)
Dept: ORTHOPEDIC SURGERY | Age: 40
End: 2020-02-10

## 2020-02-10 VITALS — WEIGHT: 315 LBS | BODY MASS INDEX: 40.43 KG/M2 | HEIGHT: 74 IN

## 2020-02-10 PROCEDURE — 99024 POSTOP FOLLOW-UP VISIT: CPT | Performed by: ORTHOPAEDIC SURGERY

## 2020-02-10 NOTE — LETTER
[facility]  [address] [place] [address]  Phone: [phone]  Fax: [phone]    Luis Alberto Cronin MD        February 10, 2020     Patient: Stephanie Judd   YOB: 1980   Date of Visit: 2/10/2020       To Whom It May Concern: It is my medical opinion that Ary Arauz should remain out of work until February 24th, 2020. If you have any questions or concerns, please don't hesitate to call.     Sincerely,        MD Moiz Syed, [address] [facility]

## 2025-08-01 ENCOUNTER — HOSPITAL ENCOUNTER (EMERGENCY)
Age: 45
Discharge: HOME | End: 2025-08-01
Attending: EMERGENCY MEDICINE
Payer: COMMERCIAL

## 2025-08-01 VITALS
OXYGEN SATURATION: 100 % | HEART RATE: 70 BPM | RESPIRATION RATE: 18 BRPM | TEMPERATURE: 98.7 F | SYSTOLIC BLOOD PRESSURE: 141 MMHG | DIASTOLIC BLOOD PRESSURE: 101 MMHG

## 2025-08-01 DIAGNOSIS — S39.012A BACK STRAIN, INITIAL ENCOUNTER: Primary | ICD-10-CM

## 2025-08-01 PROCEDURE — 96372 THER/PROPH/DIAG INJ SC/IM: CPT

## 2025-08-01 PROCEDURE — 99284 EMERGENCY DEPT VISIT MOD MDM: CPT

## 2025-08-01 PROCEDURE — 6360000002 HC RX W HCPCS: Performed by: STUDENT IN AN ORGANIZED HEALTH CARE EDUCATION/TRAINING PROGRAM

## 2025-08-01 RX ORDER — KETOROLAC TROMETHAMINE 30 MG/ML
30 INJECTION, SOLUTION INTRAMUSCULAR; INTRAVENOUS ONCE
Status: COMPLETED | OUTPATIENT
Start: 2025-08-01 | End: 2025-08-01

## 2025-08-01 RX ORDER — NAPROXEN 500 MG/1
500 TABLET ORAL 2 TIMES DAILY PRN
Qty: 20 TABLET | Refills: 0 | Status: SHIPPED | OUTPATIENT
Start: 2025-08-01 | End: 2025-08-11

## 2025-08-01 RX ORDER — CYCLOBENZAPRINE HCL 10 MG
10 TABLET ORAL 3 TIMES DAILY PRN
Qty: 21 TABLET | Refills: 0 | Status: SHIPPED | OUTPATIENT
Start: 2025-08-01 | End: 2025-08-11

## 2025-08-01 RX ADMIN — KETOROLAC TROMETHAMINE 30 MG: 30 INJECTION, SOLUTION INTRAMUSCULAR at 08:38

## 2025-08-01 ASSESSMENT — ENCOUNTER SYMPTOMS
BACK PAIN: 0
SORE THROAT: 0
DIARRHEA: 0
COUGH: 0
SHORTNESS OF BREATH: 0
ABDOMINAL PAIN: 0
WHEEZING: 0
VOMITING: 0
NAUSEA: 0

## 2025-08-01 ASSESSMENT — PAIN DESCRIPTION - ORIENTATION: ORIENTATION: RIGHT;MID

## 2025-08-01 ASSESSMENT — PAIN DESCRIPTION - LOCATION: LOCATION: SHOULDER;BACK

## 2025-08-01 ASSESSMENT — PAIN DESCRIPTION - DESCRIPTORS: DESCRIPTORS: SHARP;DISCOMFORT

## 2025-08-01 ASSESSMENT — PAIN SCALES - GENERAL: PAINLEVEL_OUTOF10: 5

## 2025-08-01 NOTE — DISCHARGE INSTRUCTIONS
You are seen in the Emergency Department for back pain.  Likely pulled muscle from work-related injury.  Given in prescription for NSAIDs and muscle relaxer.  Do not take muscle relaxers prior to work or driving as they will make you sleepy.  He will need to follow-up with occupational health.  Numbers provided below.  Please call them upon discharge to set up follow-up appointment.  They may be able to see you today.  May have been given couple days off of work.  Please follow-up with primary care provider if necessary.  Please return to the emerged part for any new or worsening symptoms.

## 2025-08-01 NOTE — ED PROVIDER NOTES
[facility] EMERGENCY DEPARTMENT     Emergency Department     Faculty Attestation        I performed a history and physical examination of the patient and discussed management with the resident. I reviewed the resident’s note and agree with the documented findings and plan of care. Any areas of disagreement are noted on the chart. I was personally present for the key portions of any procedures. I have documented in the chart those procedures where I was not present during the key portions. I have reviewed the emergency nurses triage note. I agree with the chief complaint, past medical history, past surgical history, allergies, medications, social and family history as documented unless otherwise noted below.    For mid-level providers such as nurse practitioners as well as physicians assistants:    I have personally seen and evaluated the patient.    I find the patient's history and physical exam are consistent with NP/PA documentation.  I agree with the care provided, treatment rendered, disposition, & follow-up plan.     Additional findings are as noted.    Vital Signs: BP (!) 141/101   Pulse 70   Temp 98.7 °F (37.1 °C) (Oral)   Resp 18   SpO2 100%   PCP:  Jah Cesar MD  Note Started: 8:38 AM EDT    Pertinent Comments:           Critical Care  None          Darnell Birmingham MD    Attending Emergency Medicine Physician            Uvaldo Birmingham MD  08/01/25 0872

## 2025-08-01 NOTE — ED PROVIDER NOTES
[facility] EMERGENCY DEPARTMENT  Emergency Department Encounter  Emergency Medicine Resident     Pt Name:Nilo Felton  MRN: [id number]  Birthdate 1980  Date of evaluation: 8/1/25  PCP:  Jah Cesar MD  Note Started: 3:18 PM EDT      CHIEF COMPLAINT       Chief Complaint   Patient presents with    Shoulder Pain    Back Pain       HISTORY OF PRESENT ILLNESS  (Location/Symptom, Timing/Onset, Context/Setting, Quality, Duration, Modifying Factors, Severity.)      Nilo Felton is a 45 y.o. male who presents with right shoulder, back pain.  Patient states that he was at work using a large wrench when he felt a pulling sensation in his right shoulder radiating down to his right back.  Did not hear or feel any popping sensations.  No traumatic injury.  No numbness or tingling in the extremity.  No other complaints at this time.    PAST MEDICAL / SURGICAL / SOCIAL / FAMILY HISTORY      has a past medical history of Arrhythmia, Atrial fibrillation (HCC), Diabetes (HCC), Hypertension, and Palpitations.       has a past surgical history that includes Carpal tunnel release (Right, 12/02/2019); Carpal tunnel release (Right, 12/2/2019); and Carpal tunnel release (N/A, 1/27/2020).      Social History     Socioeconomic History    Marital status:      Spouse name: Not on file    Number of children: Not on file    Years of education: Not on file    Highest education level: Not on file   Occupational History    Not on file   Tobacco Use    Smoking status: Never    Smokeless tobacco: Never   Vaping Use    Vaping status: Never Used   Substance and Sexual Activity    Alcohol use: Yes    Drug use: No    Sexual activity: Yes     Partners: Female   Other Topics Concern    Not on file   Social History Narrative    Not on file     Social Drivers of Health     Financial Resource Strain: High Risk (5/14/2025)    Received from [employer] [facility] [facility]    Overall Financial Resource Strain (CARDIA)     Difficulty of    naproxen (NAPROSYN) 500 MG tablet Take 1 tablet by mouth 2 times daily as needed for Pain 8/1/25 8/11/25 Yes Chirag Schultz, DO   cyclobenzaprine (FLEXERIL) 10 MG tablet Take 1 tablet by mouth 3 times daily as needed for Muscle spasms 8/1/25 8/11/25 Yes Chirag Schultz DO   metFORMIN (GLUCOPHAGE) 500 MG tablet Take 500 mg by mouth 2 times daily (with meals)    ProviderAlton MD   clonazePAM (KLONOPIN) 0.5 MG tablet Take 0.5 mg by mouth every 8 hours.    Provider, MD Alton   metoprolol succinate (TOPROL XL) 25 MG extended release tablet Take 1 tablet by mouth daily 2/11/19   Aaron Chow APRN - CNP   flecainide (TAMBOCOR) 100 MG tablet Take 1 tablet by mouth 2 times daily as needed (PAF/Palpitations)  Patient taking differently: Take 100 mg by mouth 2 times daily  2/5/19   Aaron Chow APRN - CNP         REVIEW OF SYSTEMS       Review of Systems   Constitutional:  Negative for chills, fatigue and fever.   HENT:  Negative for congestion and sore throat.    Respiratory:  Negative for cough, shortness of breath and wheezing.    Cardiovascular:  Negative for chest pain and palpitations.   Gastrointestinal:  Negative for abdominal pain, diarrhea, nausea and vomiting.   Genitourinary:  Negative for dysuria and hematuria.   Musculoskeletal:  Positive for myalgias. Negative for arthralgias and back pain.   Skin:  Negative for rash and wound.   Neurological:  Negative for dizziness, syncope and light-headedness.   Psychiatric/Behavioral:  Negative for behavioral problems.        PHYSICAL EXAM      INITIAL VITALS:   BP (!) 141/101   Pulse 70   Temp 98.7 °F (37.1 °C) (Oral)   Resp 18   SpO2 100%     Physical Exam  Constitutional:       General: He is not in acute distress.     Appearance: Normal appearance.   HENT:      Head: Normocephalic and atraumatic.      Nose: Nose normal.   Eyes:      Conjunctiva/sclera: Conjunctivae normal.      Pupils: Pupils are equal, round, and reactive to light.

## (undated) DEVICE — GLOVE ORANGE PI 8   MSG9080

## (undated) DEVICE — PENCIL ES L3M BTTN SWCH HOLSTER W/ BLDE ELECTRD EDGE

## (undated) DEVICE — Z DISCONTINUED USE 2275686 GLOVE SURG SZ 8 L12IN FNGR THK13MIL WHT ISOLEX POLYISOPRENE

## (undated) DEVICE — Z DISCONTINUED USE 2275683 GLOVE SURG SZ 6.5 L12IN FNGR THK13MIL WHT ISOLEX

## (undated) DEVICE — Device

## (undated) DEVICE — MHPB HAND AND FOOT PACK: Brand: MEDLINE INDUSTRIES, INC.

## (undated) DEVICE — SOLUTION IV IRRIG POUR BRL 0.9% SODIUM CHL 2F7124

## (undated) DEVICE — GLOVE ORANGE PI 8 1/2   MSG9085

## (undated) DEVICE — HYPODERMIC SAFETY NEEDLE: Brand: MAGELLAN

## (undated) DEVICE — DRESSING,GAUZE,XEROFORM,CURAD,1"X8",ST: Brand: CURAD

## (undated) DEVICE — DRESSING PETRO W3XL3IN OIL EMUL N ADH GZ KNIT IMPREG CELOS

## (undated) DEVICE — GLOVE SURG SZ 85 L12IN FNGR THK13MIL WHT ISOLEX POLYISOPRENE

## (undated) DEVICE — GLOVE ORANGE PI 7 1/2   MSG9075

## (undated) DEVICE — SUTURE NONABSORBABLE MONOFILAMENT 3-0 PS-1 18 IN BLK ETHILON 1663H

## (undated) DEVICE — CHLORAPREP 26ML ORANGE

## (undated) DEVICE — Z DISCONTINUED GLOVE SURG SZ 7.5 L12IN FNGR THK13MIL WHT ISOLEX